# Patient Record
Sex: MALE | Race: AMERICAN INDIAN OR ALASKA NATIVE | URBAN - METROPOLITAN AREA
[De-identification: names, ages, dates, MRNs, and addresses within clinical notes are randomized per-mention and may not be internally consistent; named-entity substitution may affect disease eponyms.]

---

## 2018-04-17 ENCOUNTER — APPOINTMENT (OUTPATIENT)
Age: 28
Setting detail: DERMATOLOGY
End: 2018-04-19

## 2018-04-17 DIAGNOSIS — D22 MELANOCYTIC NEVI: ICD-10-CM

## 2018-04-17 PROBLEM — D22.39 MELANOCYTIC NEVI OF OTHER PARTS OF FACE: Status: ACTIVE | Noted: 2018-04-17

## 2018-04-17 PROCEDURE — OTHER OTHER: OTHER

## 2018-04-17 PROCEDURE — OTHER COUNSELING EXCISION - BENIGN: OTHER

## 2018-04-17 ASSESSMENT — LOCATION DETAILED DESCRIPTION DERM: LOCATION DETAILED: LEFT SUBMANDIBULAR AREA

## 2018-04-17 ASSESSMENT — LOCATION ZONE DERM: LOCATION ZONE: FACE

## 2018-04-17 ASSESSMENT — LOCATION SIMPLE DESCRIPTION DERM: LOCATION SIMPLE: LEFT SUBMANDIBULAR AREA

## 2018-04-17 NOTE — PROCEDURE: OTHER
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 He denies any bleeding, pain, infection, or changes to the area. He states he just wants it gone now.\\nI discussed risks of surgery including poor scarring, scar contracture, pain, damage to surrounding tissues, and cosmetic nature of excision. We also discussed possible need for serial excision based on skin laxity.\\Timur questions answered. I feel he is well informed.
Other (Free Text): Patient is scheduled for cosmetic excision with Dr Hernández on 04/26/18. Quote was given to patient.
Detail Level: Detailed

## 2018-05-08 ENCOUNTER — APPOINTMENT (OUTPATIENT)
Age: 28
Setting detail: DERMATOLOGY
End: 2018-05-10

## 2018-05-08 DIAGNOSIS — D22 MELANOCYTIC NEVI: ICD-10-CM

## 2018-05-08 PROBLEM — D22.39 MELANOCYTIC NEVI OF OTHER PARTS OF FACE: Status: ACTIVE | Noted: 2018-05-08

## 2018-05-08 PROCEDURE — OTHER EXCISION, CUTANEOUS (ELECTIVE): OTHER

## 2018-05-08 ASSESSMENT — LOCATION SIMPLE DESCRIPTION DERM: LOCATION SIMPLE: LEFT SUBMANDIBULAR AREA

## 2018-05-08 ASSESSMENT — LOCATION ZONE DERM: LOCATION ZONE: FACE

## 2018-05-08 ASSESSMENT — LOCATION DETAILED DESCRIPTION DERM: LOCATION DETAILED: LEFT SUBMANDIBULAR AREA

## 2018-05-08 NOTE — PROCEDURE: EXCISION, CUTANEOUS (ELECTIVE)
Anesthesia Type: 1% lidocaine with epinephrine
Suture Removal: 7 days
Medical Necessity Clause: This procedure was medically necessary because the lesion that was treated was:
Epidermal Closure: running
Additional Anesthesia Volume In Cc: 0
Price $ (Use Numbers Only, No Text Please.): 9736.15
Dressing: pressure dressing with telfa
Epidermal Sutures: 5-0 Prolene
Render Post-Care Instructions In Note?: no
Size Of Margin In Cm: 0.1
Dermal Sutures: 4-0 Vicryl
Detail Level: Detailed
Size Of Lesion In Cm: 2.7
Anesthesia Volume In Cc: 13
Consent was obtained from the patient. The risks and benefits to therapy were discussed in detail. Specifically, the risks of infection, scarring, bleeding, prolonged wound healing, incomplete removal, allergy to anesthesia, nerve injury and recurrence were addressed. Prior to the procedure, the treatment site was clearly identified and confirmed by the patient. All components of Universal Protocol/PAUSE Rule completed.
Hemostasis: Electrocautery
Estimated Blood Loss (Cc): minimal
Excision Depth: adipose tissue
Excision Method: Elliptical
Post-Care Instructions: I reviewed with the patient in detail post-care instructions. Patient is not to engage in any heavy lifting, exercise, or swimming for the next 14 days. Should the patient develop any fevers, chills, bleeding, severe pain patient will contact the office immediately.
Layered Repair: Complex
Wound Care: Bacitracin
Intermediate / Complex Repair - Final Wound Length In Cm: 5
X Size Of Lesion In Cm (Optional): 1.9
Scalpel Size: 15 blade
Path Notes (To The Pathologist): Please check margins, suture at 12:00

## 2018-05-08 NOTE — HPI: SKIN LESION
Has Your Skin Lesion Been Treated?: not been treated
Is This A New Presentation, Or A Follow-Up?: Skin Lesion
Additional History: Patient is here for Excision if a benign lesion on his face since birth

## 2018-05-15 ENCOUNTER — APPOINTMENT (OUTPATIENT)
Age: 28
Setting detail: DERMATOLOGY
End: 2018-05-21

## 2018-05-15 DIAGNOSIS — Z48.02 ENCOUNTER FOR REMOVAL OF SUTURES: ICD-10-CM

## 2018-05-15 PROCEDURE — OTHER SUTURE REMOVAL (GLOBAL PERIOD): OTHER

## 2018-05-15 ASSESSMENT — LOCATION SIMPLE DESCRIPTION DERM: LOCATION SIMPLE: LEFT SUBMANDIBULAR AREA

## 2018-05-15 ASSESSMENT — LOCATION DETAILED DESCRIPTION DERM: LOCATION DETAILED: LEFT SUBMANDIBULAR AREA

## 2018-05-15 ASSESSMENT — LOCATION ZONE DERM: LOCATION ZONE: FACE

## 2018-05-15 NOTE — PROCEDURE: SUTURE REMOVAL (GLOBAL PERIOD)
Detail Level: Detailed
Add 02682 Cpt? (Important Note: In 2017 The Use Of 00772 Is Being Tracked By Cms To Determine Future Global Period Reimbursement For Global Periods): yes